# Patient Record
Sex: MALE | Race: WHITE
[De-identification: names, ages, dates, MRNs, and addresses within clinical notes are randomized per-mention and may not be internally consistent; named-entity substitution may affect disease eponyms.]

---

## 2019-02-11 ENCOUNTER — HOSPITAL ENCOUNTER (EMERGENCY)
Dept: HOSPITAL 25 - UCCORT | Age: 44
Discharge: HOME | End: 2019-02-11
Payer: COMMERCIAL

## 2019-02-11 VITALS — DIASTOLIC BLOOD PRESSURE: 92 MMHG | SYSTOLIC BLOOD PRESSURE: 144 MMHG

## 2019-02-11 DIAGNOSIS — J11.1: Primary | ICD-10-CM

## 2019-02-11 DIAGNOSIS — R19.7: ICD-10-CM

## 2019-02-11 LAB — FLUAV RNA SPEC QL NAA+PROBE: POSITIVE

## 2019-02-11 PROCEDURE — 99202 OFFICE O/P NEW SF 15 MIN: CPT

## 2019-02-11 PROCEDURE — G0463 HOSPITAL OUTPT CLINIC VISIT: HCPCS

## 2019-02-11 NOTE — UC
FLU HPI





- HPI Summary


HPI Summary: 





44 yo male presents with fever, body aches, headache, and loose stools for the 

past 2 days. He has been taking robitussin, tylenol, ibuprofen, and theraflu 

with mild relief of his symptoms. Denies sinus symptoms, sore throat, cough, SOB

, chest pain, abdominal pain, n/v.





- History of Current Complaint


Chief Complaint: UCGeneralIllness


Stated Complaint: FEVER (101),CHILLS,HEADACHE,DIARRHEA,COUGH


Time Seen by Provider: 02/11/19 18:21


Hx Obtained From: Patient


Onset/Duration: Sudden Onset


Severity Currently: Moderate


Severity Initially: Moderate


Pain Intensity: 8


Pain Scale Used: 0-10 Numeric





- Allergy/Home Medications


Allergies/Adverse Reactions: 


 Allergies











Allergy/AdvReac Type Severity Reaction Status Date / Time


 


No Known Allergies Allergy   Verified 02/11/19 18:15











Home Medications: 


 Home Medications





Guaifen/Dextromethorphan/PE [Robitussin Cough-Cold Cf Liq] 1 each PO ONCE 02/11/ 19 [History Confirmed 02/11/19]











PMH/Surg Hx/FS Hx/Imm Hx





- Additional Past Medical History


Additional PMH: 





None





- Surgical History


Surgical History: Yes


Surgery Procedure, Year, and Place: RIGHT KNEE X2





- Family History


Known Family History: Positive: None





- Social History


Occupation: Employed Full-time


Lives: With Family


Alcohol Use: Weekly


Substance Use Type: None


Smoking Status (MU): Never Smoked Tobacco





Review of Systems


All Other Systems Reviewed And Are Negative: Yes


Constitutional: Positive: Fever, Fatigue, Other - Body aches


Skin: Positive: Negative


Eyes: Positive: Negative


ENT: Positive: Negative


Respiratory: Positive: Negative


Cardiovascular: Positive: Negative


Gastrointestinal: Positive: Diarrhea


Genitourinary: Positive: Negative


Neurovascular: Positive: Negative


Neurological: Positive: Headache


Psychological: Positive: Negative





Physical Exam





- Summary


Physical Exam Summary: 





GENERAL: NAD. WDWN. No pain distress.


SKIN: No rashes, sores, lesions, or open wounds.


HEENT:


            Head: AT/NC


            Eyes:  EOM intact. Conjunctiva clear without inflammation or 

discharge.


            Ears: Hearing grossly normal. TMs intact, no bulging, erythema, or 

edema. 


            Nose: Nasal mucosa pink and moist. NTTP maxillary and frontal 

sinus. 


            Throat: Posterior oropharynx without exudates, erythema, or 

tonsillar enlargement.  Uvula midline.


NECK: Supple. Nontender. No lymphadenopathy. 


CHEST:  CTAB. No r/r/w. No accessory muscle use. Breathing comfortably and in 

no distress.


CV:  RRR. Without m/r/g. Pulses intact. Cap refill <2seconds


NEURO: Alert. 


PSYCH: Age appropriate behavior.





Triage Information Reviewed: Yes


Vital Signs: 


 Initial Vital Signs











Temp  98.0 F   02/11/19 18:14


 


Pulse  92   02/11/19 18:14


 


Resp  15   02/11/19 18:14


 


BP  144/92   02/11/19 18:14


 


Pulse Ox  98   02/11/19 18:14








 Laboratory Tests











  02/11/19





  18:21


 


Influenza A (Rapid)  Positive A











Vital Signs Reviewed: Yes





Flu Course/Dx





- Course


Course Of Treatment: POC flu positive. Rx for tamiflu





- Differential Dx/Diagnosis


Provider Diagnosis: 


 Influenza








Discharge





- Sign-Out/Discharge


Documenting (check all that apply): Patient Departure


All imaging exams completed and their final reports reviewed: No Studies





- Discharge Plan


Condition: Stable


Disposition: HOME


Prescriptions: 


Oseltamivir CAP* [Tamiflu CAP*] 75 mg PO BID #10 cap


Patient Education Materials:  Influenza (ED)


Referrals: 


Esme Mrelos MD [Primary Care Provider] - 


Additional Instructions: 


If you develop a fever, shortness of breath, chest pain, new or worsening 

symptoms - please call your PCP or go to the ED.


 





Your blood pressure was mildly elevated at todays visit. Please see your 

primary provider within 4 weeks for recheck and re-evaluation.








1) Rest and drink plenty of fluids





- Billing Disposition and Condition


Condition: STABLE


Disposition: Home